# Patient Record
Sex: MALE | Race: WHITE
[De-identification: names, ages, dates, MRNs, and addresses within clinical notes are randomized per-mention and may not be internally consistent; named-entity substitution may affect disease eponyms.]

---

## 2017-03-31 ENCOUNTER — HOSPITAL ENCOUNTER (OUTPATIENT)
Dept: HOSPITAL 8 - CFH | Age: 82
Discharge: HOME | End: 2017-03-31
Attending: SPECIALIST
Payer: COMMERCIAL

## 2017-03-31 DIAGNOSIS — I65.23: Primary | ICD-10-CM

## 2017-03-31 PROCEDURE — 93880 EXTRACRANIAL BILAT STUDY: CPT

## 2020-02-28 ENCOUNTER — HOSPITAL ENCOUNTER (EMERGENCY)
Dept: HOSPITAL 8 - ED | Age: 85
Discharge: HOME | End: 2020-02-28
Payer: MEDICARE

## 2020-02-28 VITALS — BODY MASS INDEX: 20.51 KG/M2 | HEIGHT: 72 IN | WEIGHT: 151.44 LBS

## 2020-02-28 VITALS — DIASTOLIC BLOOD PRESSURE: 72 MMHG | SYSTOLIC BLOOD PRESSURE: 163 MMHG

## 2020-02-28 DIAGNOSIS — I10: ICD-10-CM
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
## 2020-02-28 DIAGNOSIS — S46.211A: Primary | ICD-10-CM
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
## 2020-02-28 LAB
ALBUMIN SERPL-MCNC: 3.8 G/DL (ref 3.4–5)
ANION GAP SERPL CALC-SCNC: 4 MMOL/L (ref 5–15)
BASOPHILS # BLD AUTO: 0.02 X10^3/UL (ref 0–0.1)
BASOPHILS NFR BLD AUTO: 0 % (ref 0–1)
CALCIUM SERPL-MCNC: 8.3 MG/DL (ref 8.5–10.1)
CHLORIDE SERPL-SCNC: 102 MMOL/L (ref 98–107)
CREAT SERPL-MCNC: 0.84 MG/DL (ref 0.7–1.3)
EOSINOPHIL # BLD AUTO: 0.07 X10^3/UL (ref 0–0.4)
EOSINOPHIL NFR BLD AUTO: 1 % (ref 1–7)
ERYTHROCYTE [DISTWIDTH] IN BLOOD BY AUTOMATED COUNT: 14.6 % (ref 9.4–14.8)
INR PPP: 0.96 (ref 0.93–1.1)
LYMPHOCYTES # BLD AUTO: 1.45 X10^3/UL (ref 1–3.4)
LYMPHOCYTES NFR BLD AUTO: 18 % (ref 22–44)
MCH RBC QN AUTO: 29.3 PG (ref 27.5–34.5)
MCHC RBC AUTO-ENTMCNC: 33.3 G/DL (ref 33.2–36.2)
MCV RBC AUTO: 88.2 FL (ref 81–97)
MD: NO
MONOCYTES # BLD AUTO: 0.57 X10^3/UL (ref 0.2–0.8)
MONOCYTES NFR BLD AUTO: 7 % (ref 2–9)
NEUTROPHILS # BLD AUTO: 5.79 X10^3/UL (ref 1.8–6.8)
NEUTROPHILS NFR BLD AUTO: 73 % (ref 42–75)
PLATELET # BLD AUTO: 233 X10^3/UL (ref 130–400)
PMV BLD AUTO: 8.3 FL (ref 7.4–10.4)
PROTHROMBIN TIME: 10.2 SECONDS (ref 9.6–11.5)
RBC # BLD AUTO: 4.11 X10^6/UL (ref 4.38–5.82)

## 2020-02-28 PROCEDURE — 96375 TX/PRO/DX INJ NEW DRUG ADDON: CPT

## 2020-02-28 PROCEDURE — 73060 X-RAY EXAM OF HUMERUS: CPT

## 2020-02-28 PROCEDURE — 80048 BASIC METABOLIC PNL TOTAL CA: CPT

## 2020-02-28 PROCEDURE — 85025 COMPLETE CBC W/AUTO DIFF WBC: CPT

## 2020-02-28 PROCEDURE — 93971 EXTREMITY STUDY: CPT

## 2020-02-28 PROCEDURE — 82040 ASSAY OF SERUM ALBUMIN: CPT

## 2020-02-28 PROCEDURE — 85610 PROTHROMBIN TIME: CPT

## 2020-02-28 PROCEDURE — 96374 THER/PROPH/DIAG INJ IV PUSH: CPT

## 2020-02-28 PROCEDURE — 36415 COLL VENOUS BLD VENIPUNCTURE: CPT

## 2020-02-28 PROCEDURE — 71045 X-RAY EXAM CHEST 1 VIEW: CPT

## 2020-02-28 PROCEDURE — 99285 EMERGENCY DEPT VISIT HI MDM: CPT

## 2020-02-28 NOTE — NUR
THIS IS AN 86 YO MALE WHO PRESENTS TO THE ER C/O WORSENING SWELLING TO LYDIA 
SINCE LAST NIGHT. PT HAS KNOWN BICEP TEAR AFTER TAKING OUT THE TRASH APPROX 2 
WEEKS AGO. PT  C/O 10/10 PAIN. PT MEDICATED AS ORDERED. US AT BEDSIDE. SON AT 
BEDSIDE. PT ON CONT BP AND O2 MONITORS. REPORT TO YASMINE HERNADEZ WHO ASSUMED CARE 
OF PT.

## 2020-02-28 NOTE — NUR
PT IV REMOVED WITH CATH INTACT. PT ASSISTED IN DRESSING AND RIGHT SLING 
APPLIED. DISCHARGE INSTRUCTIONS GIVEN TO PATIENT AND SON. PT GIVEN PRESCRIPTION 
FOR 2 MEDICATIONS. RN DISCUSSES MEDICATIONS WITH PATIENT. PT AND SON TEACH BACK 
USE OF MEDICATIONS, SIDE EFFECTS AND DOSING. PT OUT OF ED PER WHEELCHAIR WITH 
SON. PT HAS CANE UPON DISCHARGE.

## 2021-01-14 DIAGNOSIS — Z23 NEED FOR VACCINATION: ICD-10-CM
